# Patient Record
Sex: FEMALE | Race: WHITE | NOT HISPANIC OR LATINO | Employment: OTHER | URBAN - METROPOLITAN AREA
[De-identification: names, ages, dates, MRNs, and addresses within clinical notes are randomized per-mention and may not be internally consistent; named-entity substitution may affect disease eponyms.]

---

## 2017-01-10 ENCOUNTER — TELEPHONE (OUTPATIENT)
Dept: ONCOLOGY | Facility: CLINIC | Age: 72
End: 2017-01-10

## 2017-01-10 NOTE — TELEPHONE ENCOUNTER
I called Verenice to see why she didn't make appt with Dr. Melton, no answer.  Then called daughter, Linn, no answer, left vm that they need to reschedule to be seen by pulmonology.

## 2017-01-12 ENCOUNTER — OFFICE VISIT (OUTPATIENT)
Dept: PULMONOLOGY | Facility: CLINIC | Age: 72
End: 2017-01-12

## 2017-01-12 VITALS
BODY MASS INDEX: 21.05 KG/M2 | SYSTOLIC BLOOD PRESSURE: 132 MMHG | OXYGEN SATURATION: 97 % | DIASTOLIC BLOOD PRESSURE: 76 MMHG | WEIGHT: 118.8 LBS | HEART RATE: 100 BPM | TEMPERATURE: 97.7 F | RESPIRATION RATE: 18 BRPM | HEIGHT: 63 IN

## 2017-01-12 DIAGNOSIS — C34.81 MALIGNANT NEOPLASM OF OVERLAPPING SITES OF RIGHT LUNG (HCC): Primary | ICD-10-CM

## 2017-01-12 DIAGNOSIS — R91.8 ABNORMAL CT SCAN OF LUNG: ICD-10-CM

## 2017-01-12 DIAGNOSIS — R06.02 SHORTNESS OF BREATH: ICD-10-CM

## 2017-01-12 DIAGNOSIS — R91.8 ABNORMAL CT LUNG SCREENING: Primary | ICD-10-CM

## 2017-01-12 PROBLEM — J43.2 CENTRILOBULAR EMPHYSEMA (HCC): Status: ACTIVE | Noted: 2017-01-12

## 2017-01-12 PROCEDURE — 94375 RESPIRATORY FLOW VOLUME LOOP: CPT | Performed by: INTERNAL MEDICINE

## 2017-01-12 PROCEDURE — 99205 OFFICE O/P NEW HI 60 MIN: CPT | Performed by: INTERNAL MEDICINE

## 2017-01-12 PROCEDURE — 94729 DIFFUSING CAPACITY: CPT | Performed by: INTERNAL MEDICINE

## 2017-01-12 PROCEDURE — 94726 PLETHYSMOGRAPHY LUNG VOLUMES: CPT | Performed by: INTERNAL MEDICINE

## 2017-01-12 RX ORDER — MIRABEGRON 25 MG/1
TABLET, FILM COATED, EXTENDED RELEASE ORAL
Refills: 0 | COMMUNITY
Start: 2016-12-14

## 2017-01-12 RX ORDER — METHENAMINE HIPPURATE 1000 MG/1
TABLET ORAL
Refills: 0 | COMMUNITY
Start: 2016-12-13

## 2017-01-12 NOTE — MR AVS SNAPSHOT
Verenice Cainn   1/12/2017 11:00 AM   Office Visit    Dept Phone:  [phone]   Encounter #:  [id number]    Provider:  Robbin Dacosta MD   Department:  [facility]                Your Full Care Plan              Your Updated Medication List          This list is accurate as of: 1/12/17 11:12 AM.  Always use your most recent med list.                ADVAIR DISKUS 250-50 MCG/DOSE DISKUS   Generic drug:  fluticasone-salmeterol       hydrochlorothiazide 25 MG tablet   Commonly known as:  HYDRODIURIL       methenamine 1 G tablet   Commonly known as:  HIPREX       MYRBETRIQ 25 MG tablet sustained-release 24 hour   Generic drug:  Mirabegron ER       NEXIUM 40 MG capsule   Generic drug:  esomeprazole       oxyCODONE 15 MG immediate release tablet   Commonly known as:  ROXICODONE       prednisoLONE acetate 1 % ophthalmic suspension   Commonly known as:  PRED FORTE       verapamil  MG CR tablet   Commonly known as:  CALAN-SR       vitamin D 25360 UNITS capsule capsule   Commonly known as:  ERGOCALCIFEROL       XANAX 2 MG tablet   Generic drug:  ALPRAZolam       ZETIA 10 MG tablet   Generic drug:  ezetimibe               We Performed the Following     Pulmonary Function Test       You Were Diagnosed With        Codes Comments    Abnormal CT lung screening    -  Primary ICD-10-CM: R91.8  ICD-9-CM: 793.2       Instructions     None    Patient Instructions History      Upcoming Appointments     Visit Type Date Time Department    NEW PATIENT 1/12/2017 11:00 AM [facility]    CHEST X-RAY 1/12/2017 10:00 AM [facility]    FULL PFT 1/12/2017 10:30 AM [facility]    FOLLOW UP 4/19/2017 12:30 PM [facility]    FOLLOW UP 1 UNIT 11/7/2017  2:45 PM [facility]      MyChart Signup     Our records indicate that you have declined [facility] MyChart signup. If you would like to sign up for MyChart, please  "email [email or web address] or call [phone] to obtain an activation code.             Other Info from Your Visit           Your Appointments     Apr 19, 2017 12:30 PM EDT   Follow Up with Robbin Dacosta MD   [facility] (--)    [address] [place] [address] [address]  [facility] [id number] [phone]           Arrive 15 minutes prior to appointment.            Nov 07, 2017  2:45 PM EST   FOLLOW UP with João Watts MD   [facility] ([place])    [address], [address]  [facility] [address] [phone]              Allergies     No Known Allergies      Reason for Visit     Breathing Problem           Vital Signs     Blood Pressure Pulse Temperature Respirations Height Weight    132/76 100 97.7 °F (36.5 °C) 18 63\" (160 cm) 118 lb 12.8 oz (53.9 kg)    Oxygen Saturation Body Mass Index Smoking Status             97% 21.04 kg/m2 Former Smoker         Problems and Diagnoses Noted     Abnormal chest CT    -  Primary      Results     Pulmonary Function Test               "

## 2017-01-12 NOTE — PROGRESS NOTES
CHIEF COMPLAINT:  Referral for abnormal CT scan.    HISTORY OF PRESENT ILLNESS:  A 71-year-old white female with extensive smoking history, 4 packs per day for 30 years, quit in , COPD, hypertension, hyperlipidemia, recent admission to  [facility] in 2016 for septic shock from urinary tract infection requiring intubation and mechanical ventilation for 24 hours.  Data deficient, I do not have those records readily available.  She also has an extensive history of lung cancer.  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  She had either a new primary or recurrence in the left upper lobe in  which underwent CyberKnife treatment.  Subsequent PETs and imaging have been stable with a left apical pulmonary parenchymal scar that was borderline hypermetabolic.  The patient just had a CT scan performed for followup 2016 that showed a left upper lobe scar and showed in the left lower lobe 4 new nodules, the largest of which is 9 mm concerning for metastatic disease versus inflammatory process.  This is new from CT scan performed at [facility] on 2016.  The patient is asymptomatic from the standpoint of these nodules.  Her exercise is decent considering that she has had a pneumonectomy.  The patient is able to go up a flight of stairs and walk on a level surface without too much difficulty. She takes Symbicort and p.r.n. rescue inhaler.  The patient has no other complaints.    PAST MEDICAL HISTORY:  1.  Nonsmall cell lung cancer as detailed above.  2.  Hypertension.  3.  Hyperlipidemia.  4.  Vitamin D deficiency.  5.  COPD.    PAST SURGICAL HISTORY:  1.  Pneumonectomy in , right lung.  2.  Bladder surgery.  3.  Back surgery.    ALLERGIES/MEDICATIONS:  Reviewed.    FAMILY HISTORY:  Significant for a brother who  of lung cancer and multiple children with cancer including colon, breast, and cervical cancer.    SOCIAL  "HISTORY:  The patient has grade school education.  She is on disability.  She smoked 4 packs per day for 30 years, quit in 2004.  No other pulmonary exposures.    REVIEW OF SYSTEMS:  Her HPI, otherwise all other systems were reviewed and were negative.    DIAGNOSTIC DATA:  PFT reviewed and interpreted by me shows a combined obstructive and restrictive defect of moderate severity, moderate reduction in DLCO that partially corrects for alveolar volume, overall consistent with the patient's known pneumonectomy and COPD.  CT reviewed and interpreted by me shows right pneumonectomy, stable left upper lobe scar, and 4 new nodules in the left lower lobe, the largest of which is 9 mm concerning for metastatic disease versus inflammatory versus granulomatous disease.        Physical Exam   Constitutional: Oriented to person, place, and time. Cachectic appearing.   Head: Normocephalic and atraumatic.   Nose: Nose normal.   Mouth/Throat: Oropharynx is clear and moist.   Eyes: Conjunctivae are normal.   Neck: No tracheal deviation present.   Cardiovascular: Normal rate, regular rhythm, normal heart sounds and intact distal pulses.  Exam reveals no gallop and no friction rub.    No murmur heard.  Pulmonary/Chest: decreased breath sounds right lung, normal effort  Abdominal: Soft. Bowel sounds are normal. No distension. No tenderness. There is no guarding.   Musculoskeletal: Normal range of motion. No edema.   Lymphadenopathy:  No cervical adenopathy.   Neurological: Alert and oriented to person, place, and time.  No Focal Neurological Deficits Observed   Skin: Skin is warm and dry. No rash noted.   Psychiatric: Normal mood and affect.  Behavior is normal. Judgment normal.     Impression & Plan    1)  Abnormal CT - concerning for recurrent malignancy.  Navigational bronchoscopy not possible to my knowledge in the setting of pneumonectomy the system will not \"register\" properly.  Radial EBUS without navigation with lesions this " small would be very low yield.  CT guided biopsy would have a very high risk of pneumothorax in a patient with compromised lung function and pneumonectomy this risk would be prohibitive.  The largest and most concerning lesion is 9 mm plus which would be above the 8 mm threshold for PET.  Safest thing to do would be to do a PET and if positive treat with empiric SBRT.  If PET negative will repeat CT in 3 months.    2) COPD - continue symbicort and rescue inhaler, add incruse if her insurance will pay for it    Will call patient with results of PET    RTC 3 months    Robbin Dacosta MD  Pulmonology and Critical Care Medicine  01/12/17 11:23 AM  Electronically Signed

## 2017-01-18 ENCOUNTER — HOSPITAL ENCOUNTER (OUTPATIENT)
Dept: PET IMAGING | Facility: HOSPITAL | Age: 72
Discharge: HOME | End: 2017-01-18
Attending: INTERNAL MEDICINE

## 2017-01-18 ENCOUNTER — TELEPHONE (OUTPATIENT)
Dept: CARDIOLOGY | Facility: CLINIC | Age: 72
End: 2017-01-18

## 2017-01-18 ENCOUNTER — HOSPITAL ENCOUNTER (OUTPATIENT)
Dept: PET IMAGING | Facility: HOSPITAL | Age: 72
Discharge: HOME | End: 2017-01-18
Attending: INTERNAL MEDICINE | Admitting: INTERNAL MEDICINE

## 2017-01-18 DIAGNOSIS — C34.81 MALIGNANT NEOPLASM OF OVERLAPPING SITES OF RIGHT LUNG (HCC): ICD-10-CM

## 2017-01-18 DIAGNOSIS — R06.02 SHORTNESS OF BREATH: ICD-10-CM

## 2017-01-18 LAB — GLUCOSE BLDC GLUCOMTR-MCNC: 84 MG/DL (ref 70–130)

## 2017-01-18 PROCEDURE — 0 FLUDEOXYGLUCOSE F18 SOLUTION: Performed by: INTERNAL MEDICINE

## 2017-01-18 PROCEDURE — 82962 GLUCOSE BLOOD TEST: CPT

## 2017-01-18 PROCEDURE — 78815 PET IMAGE W/CT SKULL-THIGH: CPT

## 2017-01-18 PROCEDURE — A9552 F18 FDG: HCPCS | Performed by: INTERNAL MEDICINE

## 2017-01-18 RX ADMIN — FLUDEOXYGLUCOSE F18 1 DOSE: 300 INJECTION INTRAVENOUS at 10:32

## 2017-01-18 NOTE — TELEPHONE ENCOUNTER
Called with results of PET, left message on machine.  Will discuss + PET w/ Dr. Watts and Dr. Mendoza, probably needs SBRT.    Robbin Dacosta MD  Pulmonology and Critical Care Medicine  01/18/17 5:18 PM  Electronically Signed

## 2017-01-20 DIAGNOSIS — C34.81 MALIGNANT NEOPLASM OF OVERLAPPING SITES OF RIGHT LUNG (HCC): Primary | ICD-10-CM

## 2017-01-24 ENCOUNTER — TELEPHONE (OUTPATIENT)
Dept: PULMONOLOGY | Facility: CLINIC | Age: 72
End: 2017-01-24

## 2017-01-24 NOTE — TELEPHONE ENCOUNTER
Spoke with patient directly regarding her positive PET results.  I also discussed the case with Dr. Watts.  Given her already compromised lung function and prior pneumonectomy, we both feel SBRT would probably be her best option and least risk.  She has an appointment to see Dr. Mendoza Feb 6th.  Patient verbalized understanding.    Robbin Dacosta MD  Pulmonology and Critical Care Medicine  01/24/17 11:46 AM  Electronically Signed

## 2017-02-02 ENCOUNTER — TELEPHONE (OUTPATIENT)
Dept: ONCOLOGY | Facility: CLINIC | Age: 72
End: 2017-02-02

## 2017-02-02 NOTE — TELEPHONE ENCOUNTER
----- Message from Christiana Mendoza MA sent at 2/2/2017 11:13 AM EST -----  Regarding: Mac- questions about cyberknife  Contact: [phone]  Pt daughter has questions about cyber knife treatments that she wanted to run by Dr Watts/staff.

## 2017-02-06 ENCOUNTER — HOSPITAL ENCOUNTER (OUTPATIENT)
Dept: RADIATION ONCOLOGY | Facility: HOSPITAL | Age: 72
Setting detail: RADIATION/ONCOLOGY SERIES
Discharge: HOME | End: 2017-02-06

## 2017-02-09 ENCOUNTER — APPOINTMENT (OUTPATIENT)
Dept: RADIATION ONCOLOGY | Facility: HOSPITAL | Age: 72
End: 2017-02-09

## 2017-02-09 ENCOUNTER — OFFICE VISIT (OUTPATIENT)
Dept: RADIATION ONCOLOGY | Facility: HOSPITAL | Age: 72
End: 2017-02-09

## 2017-02-09 VITALS
BODY MASS INDEX: 21.55 KG/M2 | HEART RATE: 85 BPM | RESPIRATION RATE: 16 BRPM | DIASTOLIC BLOOD PRESSURE: 67 MMHG | TEMPERATURE: 98.3 F | SYSTOLIC BLOOD PRESSURE: 147 MMHG | HEIGHT: 63 IN | WEIGHT: 121.6 LBS

## 2017-02-09 DIAGNOSIS — C34.92 MALIGNANT NEOPLASM OF LEFT LUNG, UNSPECIFIED PART OF LUNG (HCC): Primary | ICD-10-CM

## 2017-02-09 PROCEDURE — 77470 SPECIAL RADIATION TREATMENT: CPT | Performed by: RADIOLOGY

## 2017-02-09 PROCEDURE — 77334 RADIATION TREATMENT AID(S): CPT | Performed by: RADIOLOGY

## 2017-02-09 PROCEDURE — 77290 THER RAD SIMULAJ FIELD CPLX: CPT | Performed by: RADIOLOGY

## 2017-02-09 NOTE — PROGRESS NOTES
CONSULTATION NOTE    NAME:      Verenice Bravo  :                                                          1945  DATE OF CONSULTATION:                       17   REQUESTING PHYSICIAN:                   Robbin Bledsoe*  REASON FOR CONSULTATION:             Radiographic diagnosis of lung cancer, [id number], Stage I       BRIEF HISTORY:  Verenice Bravo  is a very pleasant 71 y.o. female  with a history of Stage III squamous cell carcinoma of the lung status post right pneumonectomy 2005.  She had CyberKnife  SBRT to PET probable left upper lobe cancer in .  In  she developed had a new left upper lobe mass in the apical region measuring 3 cm with small cavitary component compared to  2011. Subsequent PET in 2012 showed what was most likely postradiation scar lighting up and not clearly PET malignancy at that junction. Most recent PET scan 2013 revealed decreasing activity in the left apical pulmonary parenchymal scar and was only borderline hypermetabolic at that time. Follow-up CTs have been stable.     The patient had a PET scan on17 that read:  New findings are noted from previous studies of 2013. There is a 1.8 x 1.5 cm soft tissue nodule in the posterior midportion of the left lung which is a new finding and which is substantially hypermetabolic with a maximum SUV value of 11.53 indicating contralateral tumor recurrence or metastatic deposit.  2. In addition, there is a small spiculated lung nodule in the anterior portion of the residual left lung measuring 8 x 10 mm with a maximum SUV value of 1.97. Although this is below the malignant threshold, this nodule is new and spiculated and relatively small. It is considered an early metastasis. Therefore, there are 2 lesions in the left lung, both new from previous studies.  3. Right pneumonectomy changes are identified in the right hemithorax which are stable. There is no hypermetabolic mediastinal or hilar mass  "lesion.  4. Adrenal glands, liver, abdomen and retroperitoneum are clear. The upper mediastinum and thoracic inlet are unremarkable. There is marked pleural and fibronodular apical scarring on the left which is not hypermetabolic.     She is here to discuss CK SBRT.  She has complaints of SOA and gait disburbance.    .   No Known Allergies    Social History   Substance Use Topics   • Smoking status: Former Smoker     Packs/day: 4.00     Years: 33.00     Types: Cigarettes     Quit date: 1/17/2001   • Smokeless tobacco: Never Used   • Alcohol use No       Past Medical History   Diagnosis Date   • Arm and leg pain      Mild left   • Arthritis    • COPD (chronic obstructive pulmonary disease)    • H/O chest x-ray 04/22/2012     Spiculated BRAN lesion new in compariosn to previous. A chest CT is recommended   • Hypertension    • Lung cancer        family history includes Cancer in her other; Diabetes in her mother.     Past Surgical History   Procedure Laterality Date   • Lung surgery       rt lung removed   • Bladder surgery  04/2016     Tack with mesh   • Other surgical history       Pneumonectomy Complete   • Thoracotomy     • Tubal abdominal ligation     • Back surgery     • Eye surgery       Cataract        Review of Systems   Constitutional: Positive for fatigue.   Respiratory: Positive for shortness of breath (with extreme activity).    Gastrointestinal: Positive for constipation.   Musculoskeletal: Positive for back pain and gait problem.   Neurological: Positive for gait problem. Headaches: Pt states her feet get tired.           Objective   VITAL SIGNS:   Vitals:    02/09/17 0824   BP: 147/67   Pulse: 85   Resp: 16   Temp: 98.3 °F (36.8 °C)   Weight: 121 lb 9.6 oz (55.2 kg)   Height: 63\" (160 cm)   PainSc: 0-No pain        KPS        90%    Physical Exam   Constitutional: She is oriented to person, place, and time. She appears well-developed and well-nourished. No distress.   HENT:   Head: Normocephalic and " atraumatic.   Eyes: EOM are normal. No scleral icterus.   Neck: Neck supple.   Cardiovascular: Normal rate, regular rhythm and normal heart sounds.  Exam reveals no gallop and no friction rub.    No murmur heard.  Pulmonary/Chest: Effort normal.   No  Breath sounds on right - pneumonectomy   Musculoskeletal: She exhibits no edema.   Lymphadenopathy:     She has no cervical adenopathy.   Neurological: She is alert and oriented to person, place, and time.   Skin: Skin is warm and dry.   Nursing note and vitals reviewed.       The following portions of the patient's history were reviewed and updated as appropriate: allergies, current medications, past family history, past medical history, past social history, past surgical history and problem list.    Assessment        IMPRESSION:  Radiographic diagnosis of lung cancer, [id number], Stage I     RECOMMENDATIONS:  I recommend CyberKnife stereotactic body radiotherapy to the left lung mass.  It is 1.8 cm and can be tracked.  We can't get a biopsy because she's had a pneumonectomy and we can't risk a pneumothorax.  For the same reason we can't have fiducials placed.  Anticipate 50 gray in 4 fractions to the mass that is close to the chest wall.  We will repeat scans in 3 months to follow the anterior nodule.  It's too small to track at this time.  The patient and her family are no agreement we will simulate fields today and anticipate starting next week. We will get an MRI of her brain to complete the staging.                Sierra Montgoemry MD      Errors in dictation may reflect use of voice recognition software and not all errors in transcription may have been detected prior to signing.

## 2017-02-14 PROCEDURE — 77300 RADIATION THERAPY DOSE PLAN: CPT | Performed by: RADIOLOGY

## 2017-02-14 PROCEDURE — 77295 3-D RADIOTHERAPY PLAN: CPT | Performed by: RADIOLOGY

## 2017-02-14 PROCEDURE — 77370 RADIATION PHYSICS CONSULT: CPT | Performed by: RADIOLOGY

## 2017-02-14 PROCEDURE — 77334 RADIATION TREATMENT AID(S): CPT | Performed by: RADIOLOGY

## 2017-02-17 DIAGNOSIS — C34.81 MALIGNANT NEOPLASM OF OVERLAPPING SITES OF RIGHT LUNG (HCC): Primary | ICD-10-CM

## 2017-02-21 ENCOUNTER — HOSPITAL ENCOUNTER (OUTPATIENT)
Dept: RADIATION ONCOLOGY | Facility: HOSPITAL | Age: 72
Discharge: HOME | End: 2017-02-21

## 2017-02-21 PROCEDURE — 77373 STRTCTC BDY RAD THER TX DLVR: CPT | Performed by: RADIOLOGY

## 2017-02-21 PROCEDURE — 77290 THER RAD SIMULAJ FIELD CPLX: CPT | Performed by: RADIOLOGY

## 2017-02-22 ENCOUNTER — HOSPITAL ENCOUNTER (OUTPATIENT)
Dept: RADIATION ONCOLOGY | Facility: HOSPITAL | Age: 72
Discharge: HOME | End: 2017-02-22

## 2017-02-22 PROCEDURE — 77373 STRTCTC BDY RAD THER TX DLVR: CPT | Performed by: RADIOLOGY

## 2017-02-22 PROCEDURE — 77280 THER RAD SIMULAJ FIELD SMPL: CPT | Performed by: RADIOLOGY

## 2017-02-23 ENCOUNTER — HOSPITAL ENCOUNTER (OUTPATIENT)
Dept: RADIATION ONCOLOGY | Facility: HOSPITAL | Age: 72
Discharge: HOME | End: 2017-02-23

## 2017-02-23 PROCEDURE — 77280 THER RAD SIMULAJ FIELD SMPL: CPT | Performed by: RADIOLOGY

## 2017-02-23 PROCEDURE — 77373 STRTCTC BDY RAD THER TX DLVR: CPT | Performed by: RADIOLOGY

## 2017-02-24 ENCOUNTER — HOSPITAL ENCOUNTER (OUTPATIENT)
Dept: RADIATION ONCOLOGY | Facility: HOSPITAL | Age: 72
Discharge: HOME | End: 2017-02-24

## 2017-02-24 PROCEDURE — 77373 STRTCTC BDY RAD THER TX DLVR: CPT | Performed by: RADIOLOGY

## 2017-02-24 PROCEDURE — 77280 THER RAD SIMULAJ FIELD SMPL: CPT | Performed by: RADIOLOGY

## 2017-02-24 PROCEDURE — 77336 RADIATION PHYSICS CONSULT: CPT | Performed by: RADIOLOGY

## 2017-03-16 ENCOUNTER — OFFICE VISIT (OUTPATIENT)
Dept: RADIATION ONCOLOGY | Facility: HOSPITAL | Age: 72
End: 2017-03-16

## 2017-03-16 ENCOUNTER — HOSPITAL ENCOUNTER (OUTPATIENT)
Dept: RADIATION ONCOLOGY | Facility: HOSPITAL | Age: 72
Setting detail: RADIATION/ONCOLOGY SERIES
Discharge: HOME | End: 2017-03-16

## 2017-03-16 VITALS
BODY MASS INDEX: 22.06 KG/M2 | SYSTOLIC BLOOD PRESSURE: 112 MMHG | RESPIRATION RATE: 18 BRPM | TEMPERATURE: 98.6 F | HEART RATE: 94 BPM | DIASTOLIC BLOOD PRESSURE: 56 MMHG | HEIGHT: 63 IN | WEIGHT: 124.5 LBS

## 2017-03-16 DIAGNOSIS — C34.81 MALIGNANT NEOPLASM OF OVERLAPPING SITES OF RIGHT LUNG (HCC): Primary | ICD-10-CM

## 2017-03-16 RX ORDER — GABAPENTIN 800 MG/1
TABLET ORAL
Refills: 0 | COMMUNITY
Start: 2017-03-02

## 2017-03-16 RX ORDER — SUMATRIPTAN 25 MG/1
TABLET, FILM COATED ORAL
Refills: 0 | COMMUNITY
Start: 2017-02-10

## 2017-03-16 RX ORDER — OXYCODONE HYDROCHLORIDE 30 MG/1
TABLET ORAL
Refills: 0 | COMMUNITY
Start: 2017-03-02 | End: 2017-12-06 | Stop reason: SDUPTHER

## 2017-03-16 RX ORDER — MIRABEGRON 50 MG/1
TABLET, FILM COATED, EXTENDED RELEASE ORAL
Refills: 0 | COMMUNITY
Start: 2017-02-09

## 2017-03-16 RX ORDER — AMOXICILLIN AND CLAVULANATE POTASSIUM 875; 125 MG/1; MG/1
TABLET, FILM COATED ORAL
Refills: 0 | COMMUNITY
Start: 2017-03-02

## 2017-03-16 NOTE — PROGRESS NOTES
"FOLLOW UP NOTE    PATIENT:                                                      Verenice Bravo  MEDICAL RECORD #:                        [id number]  :                                                          1945  COMPLETION DATE:   2017  DIAGNOSIS:    Radiographic diagnosis of lung cancer, [id number], Stage I     BRIEF HISTORY:    Shelly completed CyberKnife stereotactic body radiotherapy for early stage lung cancer.  Since completing treatment she was hospitalized with flu and was septic.  She has recovered and has plans to see Dr. Dacosta on .  She is on oxygen .      MEDICATIONS: Medication reconciliation for the patient was reviewed and confirmed in the electronic medical record.    Review of Systems   Respiratory: Positive for cough, shortness of breath (pt on oxygen) and wheezing.    Musculoskeletal: Positive for gait problem (due to low O2 sats.).   Neurological: Positive for extremity weakness and gait problem (due to low O2 sats.).   All other systems reviewed and are negative.      KPS 80%    Physical Exam   Constitutional: She appears well-developed and well-nourished.   Neck: Neck supple.   Cardiovascular: Normal rate, regular rhythm and normal heart sounds.    Pulmonary/Chest: Effort normal and breath sounds normal.   Nursing note and vitals reviewed.      VITAL SIGNS:   Vitals:    17 1457   BP: 112/56   Pulse: 94   Resp: 18   Temp: 98.6 °F (37 °C)   Weight: 124 lb 8 oz (56.5 kg)   Height: 63\" (160 cm)   PainSc: 0-No pain   PainLoc: Back       The following portions of the patient's history were reviewed and updated as appropriate: allergies, current medications, past family history, past medical history, past social history, past surgical history and problem list.       IMPRESSION:  No acute side effects of radiotherapy.  Her flu symptoms have resolved    RECOMMENDATIONS:  The patient continues on antibiotics Tamiflu and oxygen.  We will see her back in 6 months unless " she is being followed by Dr. Watts and Dr. Dacosta closely and then she can see us as needed.  We would recommend a 6 month PET scan to evaluate treatment.  Mrs. Bravo is excited about an upcoming trip to [place] and her first flight!         Sierra Montgomery MD    Errors in dictation may reflect use of voice recognition software and not all errors in transcription may have been detected prior to signing.

## 2017-04-10 ENCOUNTER — HOSPITAL ENCOUNTER (OUTPATIENT)
Dept: MRI IMAGING | Facility: HOSPITAL | Age: 72
End: 2017-04-10
Attending: RADIOLOGY

## 2017-04-11 ENCOUNTER — HOSPITAL ENCOUNTER (OUTPATIENT)
Dept: MRI IMAGING | Facility: HOSPITAL | Age: 72
Discharge: HOME | End: 2017-04-11
Attending: RADIOLOGY | Admitting: RADIOLOGY

## 2017-04-11 DIAGNOSIS — C34.81 MALIGNANT NEOPLASM OF OVERLAPPING SITES OF RIGHT LUNG (HCC): ICD-10-CM

## 2017-04-11 PROCEDURE — 0 GADOBENATE DIMEGLUMINE 529 MG/ML SOLUTION: Performed by: RADIOLOGY

## 2017-04-11 PROCEDURE — 70553 MRI BRAIN STEM W/O & W/DYE: CPT

## 2017-04-11 PROCEDURE — 82565 ASSAY OF CREATININE: CPT

## 2017-04-11 PROCEDURE — A9577 INJ MULTIHANCE: HCPCS | Performed by: RADIOLOGY

## 2017-04-11 RX ADMIN — GADOBENATE DIMEGLUMINE 10 ML: 529 INJECTION, SOLUTION INTRAVENOUS at 20:15

## 2017-04-12 LAB — CREAT BLDA-MCNC: 0.7 MG/DL (ref 0.6–1.3)

## 2017-04-17 ENCOUNTER — HOSPITAL ENCOUNTER (OUTPATIENT)
Dept: CT IMAGING | Facility: HOSPITAL | Age: 72
Discharge: HOME | End: 2017-04-17
Attending: INTERNAL MEDICINE | Admitting: INTERNAL MEDICINE

## 2017-04-17 DIAGNOSIS — R06.02 SHORTNESS OF BREATH: ICD-10-CM

## 2017-04-17 DIAGNOSIS — C34.81 MALIGNANT NEOPLASM OF OVERLAPPING SITES OF RIGHT LUNG (HCC): ICD-10-CM

## 2017-04-17 PROCEDURE — 71250 CT THORAX DX C-: CPT

## 2017-04-19 ENCOUNTER — OFFICE VISIT (OUTPATIENT)
Dept: PULMONOLOGY | Facility: CLINIC | Age: 72
End: 2017-04-19

## 2017-04-19 VITALS
HEART RATE: 102 BPM | RESPIRATION RATE: 16 BRPM | WEIGHT: 118.6 LBS | DIASTOLIC BLOOD PRESSURE: 70 MMHG | BODY MASS INDEX: 21.02 KG/M2 | OXYGEN SATURATION: 94 % | HEIGHT: 63 IN | SYSTOLIC BLOOD PRESSURE: 124 MMHG | TEMPERATURE: 97.2 F

## 2017-04-19 DIAGNOSIS — C34.32 MALIGNANT NEOPLASM OF LOWER LOBE OF LEFT LUNG (HCC): ICD-10-CM

## 2017-04-19 DIAGNOSIS — J43.2 CENTRILOBULAR EMPHYSEMA (HCC): Primary | ICD-10-CM

## 2017-04-19 DIAGNOSIS — Z90.2 S/P PNEUMONECTOMY: ICD-10-CM

## 2017-04-19 PROCEDURE — 99214 OFFICE O/P EST MOD 30 MIN: CPT | Performed by: INTERNAL MEDICINE

## 2017-04-19 RX ORDER — LANOLIN ALCOHOL/MO/W.PET/CERES
1000 CREAM (GRAM) TOPICAL DAILY
COMMUNITY

## 2017-04-19 NOTE — PROGRESS NOTES
CHIEF COMPLAINT: Referral for abnormal CT scan.     HISTORY OF PRESENT ILLNESS: A 71-year-old white female with extensive smoking history, 4 packs per day for 30 years, quit in 2004, COPD, hypertension, and hyperlipidemia.  She also has an extensive history of lung cancer. 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 She had either a new primary or recurrence in the left upper lobe in 2010 which underwent CyberKnife treatment. Subsequent PETs and imaging have been stable with a left apical pulmonary parenchymal scar that was borderline hypermetabolic. The patient had a CT scan performed for followup 12/19/2016 that showed a left upper lobe scar and showed in the left lower lobe 2 new dominant nodules. 1.8 cm in the left base w/ SUV 11.5 and 10 mm in apical portion w/ SUV 1.97.  The 1.8 cm nodule was treated with SBRT, the more apical 10 mm LLL nodule wasn't treated because it was to small to be tracked and fiducials weren't possible (due to prior right pneumonectomy and risk of pneumothorax).  Patient just had repeat CT scan and comes in today for follow up.  She was just hospitalized at [facility] w/ pneumonia and influenza - has been home about 3 weeks and is improving.  She still has a lot of S[place].       CT reviewed and interpreted by me shows right pneumonectomy, stable left upper lobe scar, the LLL nodule has been replaced by some radiation fibrosis, the more apical LLL nodule is stable     Physical Exam   Constitutional: Oriented to person, place, and time. Appears well-developed and well-nourished.   Head: Normocephalic and atraumatic.   Nose: Nose normal.   Mouth/Throat: Oropharynx is clear and moist.   Eyes: Conjunctivae are normal.   Neck: No tracheal deviation present.   Cardiovascular: Normal rate, regular rhythm, normal heart sounds and intact distal pulses.  Exam reveals no gallop and no friction rub.    No murmur heard.  Pulmonary/Chest:  Effort normal and breath sounds absent on right. No stridor. No respiratory distress. No wheezes. No rales. No tenderness.   Abdominal: Soft. Bowel sounds are normal. No distension. No tenderness. There is no guarding.   Musculoskeletal: Normal range of motion. No edema.   Lymphadenopathy:  No cervical adenopathy.   Neurological: Alert and oriented to person, place, and time.  No Focal Neurological Deficits Observed   Skin: Skin is warm and dry. No rash noted.   Psychiatric: Normal mood and affect.  Behavior is normal. Judgment normal.     Impression & Plan    1)  NSCLC - complicated history detailed above.  It appears that the 1.8 cm nodule is in remission.  Will continue to follow the apical/anterior 10 mm LLL nodule.  Dr. Montgomery is getting a PET in the next few months.    2)  COPD - will change everything to nebulized, given her limited lung function I think she will do better with this.  Brovana / Pulmicort / Ipratropium bid.  Albuterol prn.  She is not smoking.  Not currently interested in pulmonary rehab.  Continue O2 w/ exertion and sleep.    RTC 3 months (After PET Scan)    Robbin Dacosta MD  Pulmonology and Critical Care Medicine  04/19/17 1:07 PM  Electronically Signed

## 2017-11-13 ENCOUNTER — OFFICE VISIT (OUTPATIENT)
Dept: ONCOLOGY | Facility: CLINIC | Age: 72
End: 2017-11-13

## 2017-11-13 VITALS
TEMPERATURE: 97.4 F | SYSTOLIC BLOOD PRESSURE: 111 MMHG | WEIGHT: 108 LBS | HEART RATE: 80 BPM | HEIGHT: 63 IN | BODY MASS INDEX: 19.14 KG/M2 | RESPIRATION RATE: 18 BRPM | DIASTOLIC BLOOD PRESSURE: 53 MMHG

## 2017-11-13 DIAGNOSIS — C34.81 MALIGNANT NEOPLASM OF OVERLAPPING SITES OF RIGHT LUNG (HCC): Primary | ICD-10-CM

## 2017-11-13 DIAGNOSIS — M54.9 UPPER BACK PAIN ON LEFT SIDE: ICD-10-CM

## 2017-11-13 DIAGNOSIS — C34.82 MALIGNANT NEOPLASM OF OVERLAPPING SITES OF LEFT LUNG (HCC): ICD-10-CM

## 2017-11-13 PROCEDURE — 99213 OFFICE O/P EST LOW 20 MIN: CPT | Performed by: NURSE PRACTITIONER

## 2017-11-13 RX ORDER — ESCITALOPRAM OXALATE 10 MG/1
TABLET ORAL
Refills: 0 | COMMUNITY
Start: 2017-10-09

## 2017-11-13 RX ORDER — GABAPENTIN 600 MG/1
TABLET ORAL
Refills: 0 | COMMUNITY
Start: 2017-11-08

## 2017-11-13 NOTE — PROGRESS NOTES
CHIEF COMPLAINT: 1.  Follow-up management of lung cancer                                       2.  Left subscapular back pain    Problem List:  Oncology/Hematology History    1. 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 07/2012 she had a new left upper lobe mass  in the apical region measuring 3 cm with small cavitary component compared to  06/2011. Subsequent PET in 08/2012 showed what was most likely postradiation  scar lighting up and not clearly PET malignancy at that junction. Most recent  PET scan 01/07/2013 revealed decreasing activity in the left apical pulmonary  parenchymal scar and was only borderline hypermetabolic at that time. Follow-up  CTs have been stable.   2. History of squamous cell carcinoma of the skin.         Malignant neoplasm of overlapping sites of right lung    1/10/2005 Initial Diagnosis     Malignant neoplasm of overlapping sites of right lung         11/10/2005 Surgery     Right pneumonectomy         7/22/2010 -  Radiation     Cyberknife for probable left upper lobe recurrence         1/18/2017 Progression            1/18/2017 Imaging     PET/CT IMPRESSION:  1. The patient is status post right pneumonectomy.  2. In the residual hyperexpanded left lung, there are 2 new  hypermetabolic nodules indicating new neoplastic disease in the  contralateral left lung.  Mediastinal or hilar involvement is currently  not identified. No other distant hypermetabolic focus or abnormality is  appreciated.         02/21/2017 - 02/24/2017 Radiation     Radiation OncologyTreatment Course:  Verenice Bravo received 5000 cGy in 4 fractions to left lung tumor via Stereotactic Radiation Therapy - SRT.            HISTORY OF PRESENT ILLNESS:  The patient is a 72 y.o. female, here for follow up on management of Lung cancer.  In the interim since we saw the patient  last, she underwent CyberKnife to left lung lesion in February 2017.  Since that time, she has been hospitalized for pneumonia on 2 occasions.  One hospitalization she was on life support.  It is been a stressful time over the last few months as she has lost 2 sons within a very short time of each other due to heart disease.  She is having increasing left subscapular pain.  The pain medication she takes currently is not as effective as it once was.  She is also on oxygen 24/7.      Past Medical History:   Diagnosis Date   • Arm and leg pain     Mild left   • Arthritis    • COPD (chronic obstructive pulmonary disease)    • H/O chest x-ray 04/22/2012    Spiculated BRAN lesion new in compariosn to previous. A chest CT is recommended   • Hypertension    • Lung cancer      Past Surgical History:   Procedure Laterality Date   • BACK SURGERY     • BLADDER SURGERY  04/2016    Tack with mesh   • EYE SURGERY      Cataract   • LUNG SURGERY      rt lung removed   • OTHER SURGICAL HISTORY      Pneumonectomy Complete   • THORACOTOMY     • TUBAL ABDOMINAL LIGATION         No Known Allergies    Family History and Social History reviewed and changed as necessary      REVIEW OF SYSTEM:   Review of Systems   Constitutional: Positive for fatigue and 10 pound weight loss since April.    HENT:   Negative for mouth sores, sore throat and trouble swallowing.    Eyes: Negative for icterus.   Respiratory:Positive for shortness of breath with exertion, O2 dependent.    Cardiovascular: Negative for chest pain, leg swelling and palpitations.   Gastrointestinal: Negative for abdominal distention, abdominal pain, blood in stool, constipation, diarrhea, nausea and vomiting.   Endocrine: Negative for hot flashes.   Genitourinary: Negative for bladder incontinence, difficulty urinating, dysuria, frequency and hematuria.    Musculoskeletal: Positive for left subscapular pain chronic in nature but worsening.   Skin: Negative for rash.   Neurological:  "Negative for dizziness, gait problem, headaches, light-headedness and numbness.   Hematological: Negative for adenopathy. Does not bruise/bleed easily.   Psychiatric/Behavioral: Negative for depression. The patient is not nervous/anxious.    All other systems reviewed and are negative except as stated above in the history of present illness.       PHYSICAL EXAM    Vitals:    11/13/17 1220   BP: 111/53   Pulse: 80   Resp: 18   Temp: 97.4 °F (36.3 °C)   Weight: 108 lb (49 kg)   Height: 63\" (160 cm)     Constitutional: Petite, chronically ill-appearing elderly female. No distress.   ECOG: (3) Capable of limited self-care, confined to bed or chair > 50% of waking hours  HENT:   Head: Normocephalic.   Mouth/Throat: Oropharynx is clear and moist.   Eyes: Conjunctivae are normal. Pupils are equal, round, and reactive to light. No scleral icterus.   Neck: Neck supple. No JVD present. No thyromegaly present.   Cardiovascular: Normal rate, regular rhythm and normal heart sounds.    Pulmonary/Chest: Breath sounds  diminished throughout otherwise normal. No respiratory distress. Wearing O2.  Abdominal: Soft. Exhibits no distension.   Musculoskeletal:Exhibits no edema, tenderness or deformity.  Arrives via transport chair accompanied by family.   Neurological: Alert and oriented to person, place, and time.   Skin: No ecchymosis, no petechiae and no rash noted. Not diaphoretic. No cyanosis. Nails show no clubbing.   Psychiatric: Normal mood and affect.   Vitals reviewed.        Assessment/Plan     1.  History of recurrent left lung cancer status post CyberKnife:  The patient has not had her post-CyberKnife PET/CT for reevaluation as she had been in the hospital on 2 different occasions for pneumonia.  She is having increasing left subscapular back pain.  We will get her PET scans scheduled to restage her, we will see her back in a couple weeks to go over this.      10 minutes of today's 15 minute appointment was spent in " counseling and education in regards to the above.      Janelle Smith, APRN    11/13/2017

## 2017-11-20 ENCOUNTER — HOSPITAL ENCOUNTER (OUTPATIENT)
Dept: PET IMAGING | Facility: HOSPITAL | Age: 72
Discharge: HOME | End: 2017-11-20
Admitting: NURSE PRACTITIONER

## 2017-11-20 ENCOUNTER — HOSPITAL ENCOUNTER (OUTPATIENT)
Dept: PET IMAGING | Facility: HOSPITAL | Age: 72
Discharge: HOME | End: 2017-11-20

## 2017-11-20 DIAGNOSIS — C34.81 MALIGNANT NEOPLASM OF OVERLAPPING SITES OF RIGHT LUNG (HCC): ICD-10-CM

## 2017-11-20 LAB — GLUCOSE BLDC GLUCOMTR-MCNC: 109 MG/DL (ref 70–130)

## 2017-11-20 PROCEDURE — 82962 GLUCOSE BLOOD TEST: CPT

## 2017-11-20 PROCEDURE — 78815 PET IMAGE W/CT SKULL-THIGH: CPT

## 2017-11-20 PROCEDURE — 0 FLUDEOXYGLUCOSE F18 SOLUTION: Performed by: NURSE PRACTITIONER

## 2017-11-20 PROCEDURE — A9552 F18 FDG: HCPCS | Performed by: NURSE PRACTITIONER

## 2017-11-20 RX ADMIN — FLUDEOXYGLUCOSE F18 1 DOSE: 300 INJECTION INTRAVENOUS at 13:12

## 2017-11-28 ENCOUNTER — OFFICE VISIT (OUTPATIENT)
Dept: ONCOLOGY | Facility: CLINIC | Age: 72
End: 2017-11-28

## 2017-11-28 ENCOUNTER — LAB (OUTPATIENT)
Dept: LAB | Facility: HOSPITAL | Age: 72
End: 2017-11-28

## 2017-11-28 VITALS
TEMPERATURE: 98.2 F | BODY MASS INDEX: 18.61 KG/M2 | SYSTOLIC BLOOD PRESSURE: 147 MMHG | HEIGHT: 63 IN | RESPIRATION RATE: 16 BRPM | DIASTOLIC BLOOD PRESSURE: 68 MMHG | HEART RATE: 118 BPM | WEIGHT: 105 LBS

## 2017-11-28 DIAGNOSIS — Z85.118 PERSONAL HISTORY OF LUNG CANCER: ICD-10-CM

## 2017-11-28 DIAGNOSIS — C34.82 MALIGNANT NEOPLASM OF OVERLAPPING SITES OF LEFT LUNG (HCC): Primary | ICD-10-CM

## 2017-11-28 LAB
ALBUMIN SERPL-MCNC: 3.8 G/DL (ref 3.2–4.8)
ALBUMIN/GLOB SERPL: 1.5 G/DL (ref 1.5–2.5)
ALP SERPL-CCNC: 81 U/L (ref 25–100)
ALT SERPL W P-5'-P-CCNC: 13 U/L (ref 7–40)
ANION GAP SERPL CALCULATED.3IONS-SCNC: 6 MMOL/L (ref 3–11)
AST SERPL-CCNC: 14 U/L (ref 0–33)
BILIRUB SERPL-MCNC: 0.5 MG/DL (ref 0.3–1.2)
BUN BLD-MCNC: 19 MG/DL (ref 9–23)
BUN/CREAT SERPL: 27.1 (ref 7–25)
CALCIUM SPEC-SCNC: 9.2 MG/DL (ref 8.7–10.4)
CHLORIDE SERPL-SCNC: 99 MMOL/L (ref 99–109)
CO2 SERPL-SCNC: 29 MMOL/L (ref 20–31)
CREAT BLD-MCNC: 0.7 MG/DL (ref 0.6–1.3)
ERYTHROCYTE [DISTWIDTH] IN BLOOD BY AUTOMATED COUNT: 14.8 % (ref 11.3–14.5)
GFR SERPL CREATININE-BSD FRML MDRD: 82 ML/MIN/1.73
GLOBULIN UR ELPH-MCNC: 2.5 GM/DL
GLUCOSE BLD-MCNC: 140 MG/DL (ref 70–100)
HCT VFR BLD AUTO: 36.9 % (ref 34.5–44)
HGB BLD-MCNC: 12 G/DL (ref 11.5–15.5)
LYMPHOCYTES # BLD AUTO: 1 10*3/MM3 (ref 0.6–4.8)
LYMPHOCYTES NFR BLD AUTO: 8.4 % (ref 24–44)
MCH RBC QN AUTO: 28.9 PG (ref 27–31)
MCHC RBC AUTO-ENTMCNC: 32.4 G/DL (ref 32–36)
MCV RBC AUTO: 88.9 FL (ref 80–99)
MONOCYTES # BLD AUTO: 0.3 10*3/MM3 (ref 0–1)
MONOCYTES NFR BLD AUTO: 2.5 % (ref 0–12)
NEUTROPHILS # BLD AUTO: 10.6 10*3/MM3 (ref 1.5–8.3)
NEUTROPHILS NFR BLD AUTO: 89.1 % (ref 41–71)
PLATELET # BLD AUTO: 386 10*3/MM3 (ref 150–450)
PMV BLD AUTO: 7.5 FL (ref 6–12)
POTASSIUM BLD-SCNC: 3.1 MMOL/L (ref 3.5–5.5)
PROT SERPL-MCNC: 6.3 G/DL (ref 5.7–8.2)
RBC # BLD AUTO: 4.15 10*6/MM3 (ref 3.89–5.14)
SODIUM BLD-SCNC: 134 MMOL/L (ref 132–146)
WBC NRBC COR # BLD: 11.9 10*3/MM3 (ref 3.5–10.8)

## 2017-11-28 PROCEDURE — 99215 OFFICE O/P EST HI 40 MIN: CPT | Performed by: INTERNAL MEDICINE

## 2017-11-28 PROCEDURE — 80053 COMPREHEN METABOLIC PANEL: CPT

## 2017-11-28 PROCEDURE — 36415 COLL VENOUS BLD VENIPUNCTURE: CPT

## 2017-11-28 PROCEDURE — 85025 COMPLETE CBC W/AUTO DIFF WBC: CPT

## 2017-11-28 NOTE — PROGRESS NOTES
CHIEF COMPLAINT: mgmt of recurrent NSC Lung Cancer    Problem List:  Oncology/Hematology History    1. 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 07/2012 she had a new left upper lobe mass  in the apical region measuring 3 cm with small cavitary component compared to  06/2011. Subsequent PET in 08/2012 showed what was most likely postradiation  scar lighting up and not clearly PET malignancy at that junction. Most recent  PET scan 01/07/2013 revealed decreasing activity in the left apical pulmonary  parenchymal scar and was only borderline hypermetabolic at that time. Follow-up  CTs have been stable.   2. History of squamous cell carcinoma of the skin.         Malignant neoplasm of overlapping sites of left lung    1/10/2005 Initial Diagnosis     Malignant neoplasm of overlapping sites of right lung         11/10/2005 Surgery     Right pneumonectomy         7/22/2010 -  Radiation     Cyberknife for probable left upper lobe recurrence         1/18/2017 Progression            1/18/2017 Imaging     PET/CT IMPRESSION:  1. The patient is status post right pneumonectomy.  2. In the residual hyperexpanded left lung, there are 2 new  hypermetabolic nodules indicating new neoplastic disease in the  contralateral left lung.  Mediastinal or hilar involvement is currently  not identified. No other distant hypermetabolic focus or abnormality is  appreciated.         02/21/2017 - 02/24/2017 Radiation     Radiation OncologyTreatment Course:  Verenice Bravo received 5000 cGy in 4 fractions to left lung tumor via Stereotactic Radiation Therapy - SRT.         11/20/2017 Progression     PET/CT  Impression:     There is extensive progression of disease with abnormal  hypermetabolic activity and lesion seen within the mediastinum as well  as a large mass posteriorly within the left lung. The  abdomen and pelvis  is stable with also a small hypermetabolic focus seen near the right  temporal bone. Continued follow-up recommended as indicated.               HISTORY OF PRESENT ILLNESS:  The patient is a 72 y.o. female, here for follow up on management of NSC Lung Cancer.  I reviewed Pet results images and reports and discussed with Dr. Montgomery. Lost 2 sons to MI in September. Having solid dysphagia.        Past Medical History:   Diagnosis Date   • Arm and leg pain     Mild left   • Arthritis    • COPD (chronic obstructive pulmonary disease)    • H/O chest x-ray 04/22/2012    Spiculated BRAN lesion new in compariosn to previous. A chest CT is recommended   • Hypertension    • Lung cancer      Past Surgical History:   Procedure Laterality Date   • BACK SURGERY     • BLADDER SURGERY  04/2016    Tack with mesh   • EYE SURGERY      Cataract   • LUNG SURGERY      rt lung removed   • OTHER SURGICAL HISTORY      Pneumonectomy Complete   • THORACOTOMY     • TUBAL ABDOMINAL LIGATION         No Known Allergies    Family History and Social History reviewed and changed as necessary      REVIEW OF SYSTEM:   Review of Systems   Constitutional: Negative for appetite change, chills, diaphoresis, fatigue, fever and unexpected weight change.   HENT:   Negative for mouth sores, sore throat and trouble swallowing.    Eyes: Negative for icterus.   Respiratory: Negative for cough, hemoptysis and shortness of breath.    Cardiovascular: Negative for chest pain, leg swelling and palpitations.   Gastrointestinal: Negative for abdominal distention, abdominal pain, blood in stool, constipation, diarrhea, nausea and vomiting.   Endocrine: Negative for hot flashes.   Genitourinary: Negative for bladder incontinence, difficulty urinating, dysuria, frequency and hematuria.    Musculoskeletal: Negative for gait problem, neck pain and neck stiffness.   Skin: Negative for rash.   Neurological: Negative for dizziness, gait problem, headaches,  "light-headedness and numbness.   Hematological: Negative for adenopathy. Does not bruise/bleed easily.   Psychiatric/Behavioral: Negative for depression. The patient is not nervous/anxious.    All other systems reviewed and are negative.       PHYSICAL EXAM    Vitals:    11/28/17 1331   BP: 147/68   Pulse: 118   Resp: 16   Temp: 98.2 °F (36.8 °C)   Weight: 105 lb (47.6 kg)   Height: 63\" (160 cm)     Constitutional: Appears well-developed and well-nourished. No distress.   ECOG: (2) Ambulatory and capable of self care, unable to carry out work activity, up and about > 50% or waking hours  HENT:   Head: Normocephalic.   Mouth/Throat: Oropharynx is clear and moist.   Eyes: Conjunctivae are normal. Pupils are equal, round, and reactive to light. No scleral icterus.   Neck: Neck supple. No JVD present. No thyromegaly present.   Cardiovascular: Normal rate, regular rhythm and normal heart sounds.    Pulmonary/Chest: Breath sounds normal. No respiratory distress.   Abdominal: Soft. Exhibits no distension and no mass. There is no hepatosplenomegaly. There is no tenderness. There is no rebound and no guarding.   Musculoskeletal:Exhibits no edema, tenderness or deformity.   Neurological: Alert and oriented to person, place, and time. Exhibits normal muscle tone.   Skin: No ecchymosis, no petechiae and no rash noted. Not diaphoretic. No cyanosis. Nails show no clubbing.   Psychiatric: Normal mood and affect.   Vitals reviewed.      Lab on 11/28/2017   Component Date Value Ref Range Status   • WBC 11/28/2017 11.90* 3.50 - 10.80 10*3/mm3 Final   • RBC 11/28/2017 4.15  3.89 - 5.14 10*6/mm3 Final   • Hemoglobin 11/28/2017 12.0  11.5 - 15.5 g/dL Final   • Hematocrit 11/28/2017 36.9  34.5 - 44.0 % Final   • RDW 11/28/2017 14.8* 11.3 - 14.5 % Final   • MCV 11/28/2017 88.9  80.0 - 99.0 fL Final   • MCH 11/28/2017 28.9  27.0 - 31.0 pg Final   • MCHC 11/28/2017 32.4  32.0 - 36.0 g/dL Final   • MPV 11/28/2017 7.5  6.0 - 12.0 fL Final   • " Platelets 11/28/2017 386  150 - 450 10*3/mm3 Final   • Neutrophil % 11/28/2017 89.1* 41.0 - 71.0 % Final   • Lymphocyte % 11/28/2017 8.4* 24.0 - 44.0 % Final   • Monocyte % 11/28/2017 2.5  0.0 - 12.0 % Final   • Neutrophils, Absolute 11/28/2017 10.60* 1.50 - 8.30 10*3/mm3 Final   • Lymphocytes, Absolute 11/28/2017 1.00  0.60 - 4.80 10*3/mm3 Final   • Monocytes, Absolute 11/28/2017 0.30  0.00 - 1.00 10*3/mm3 Final   Hospital Outpatient Visit on 11/20/2017   Component Date Value Ref Range Status   • Glucose 11/20/2017 109  70 - 130 mg/dL Final       Assessment/Plan     1. 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 Nicki mets  4. Solid dysphagia due to 3.  5. COPD    Discussion:  45 min discussion with pt and family re recurrence and palliative nature of whatever rx we were to try.  Would do MR Brain & EBUS with bx for Caris with Robbin Dacosta IF decides to treat. Will see back in 10 days and she will decide.      João Watts MD    11/28/2017

## 2017-12-06 ENCOUNTER — TELEPHONE (OUTPATIENT)
Dept: ONCOLOGY | Facility: CLINIC | Age: 72
End: 2017-12-06

## 2017-12-06 RX ORDER — OXYCODONE HYDROCHLORIDE 30 MG/1
30 TABLET ORAL EVERY 6 HOURS PRN
Qty: 120 TABLET | Refills: 0 | Status: SHIPPED | OUTPATIENT
Start: 2017-12-06

## 2017-12-06 NOTE — TELEPHONE ENCOUNTER
----- Message from Chloe Calix sent at 12/6/2017 11:01 AM EST -----  Regarding: [facility] - RX   Contact: [phone]  BRENDA CAN, DAUGHTER OF PATIENT CALLED TO SEE IF DR. FATIMA COULD PRESCRIBE SOME PAIN MEDICATION. HER PAIN CLINIC WHERE SHE WAS GOING GOT SHUT DOWN.     SHE WAS GETTING ROXICET 30 MG .     PLEASE CALL WHEN READY TO .

## 2017-12-06 NOTE — TELEPHONE ENCOUNTER
Discussed with Opal Arboleda to refill oxycodone.  Dr. Palomares signed rx for patient.  Notified [facility] was ready for .

## 2017-12-07 ENCOUNTER — OFFICE VISIT (OUTPATIENT)
Dept: ONCOLOGY | Facility: CLINIC | Age: 72
End: 2017-12-07

## 2017-12-07 VITALS
HEART RATE: 110 BPM | DIASTOLIC BLOOD PRESSURE: 55 MMHG | TEMPERATURE: 97.8 F | SYSTOLIC BLOOD PRESSURE: 111 MMHG | WEIGHT: 106 LBS | BODY MASS INDEX: 18.78 KG/M2 | RESPIRATION RATE: 18 BRPM | HEIGHT: 63 IN

## 2017-12-07 DIAGNOSIS — C34.82 MALIGNANT NEOPLASM OF OVERLAPPING SITES OF LEFT LUNG (HCC): Primary | ICD-10-CM

## 2017-12-07 PROCEDURE — 99215 OFFICE O/P EST HI 40 MIN: CPT | Performed by: INTERNAL MEDICINE

## 2017-12-07 NOTE — PROGRESS NOTES
CHIEF COMPLAINT: Recurrent lung cancer    Problem List:  Oncology/Hematology History    1. 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 07/2012 she had a new left upper lobe mass  in the apical region measuring 3 cm with small cavitary component compared to  06/2011. Subsequent PET in 08/2012 showed what was most likely postradiation  scar lighting up and not clearly PET malignancy at that junction. Most recent  PET scan 01/07/2013 revealed decreasing activity in the left apical pulmonary  parenchymal scar and was only borderline hypermetabolic at that time. Follow-up  CTs have been stable.   2. History of squamous cell carcinoma of the skin.         Malignant neoplasm of overlapping sites of left lung    1/10/2005 Initial Diagnosis     Malignant neoplasm of overlapping sites of right lung         11/10/2005 Surgery     Right pneumonectomy         7/22/2010 -  Radiation     Cyberknife for probable left upper lobe recurrence         1/18/2017 Progression            1/18/2017 Imaging     PET/CT IMPRESSION:  1. The patient is status post right pneumonectomy.  2. In the residual hyperexpanded left lung, there are 2 new  hypermetabolic nodules indicating new neoplastic disease in the  contralateral left lung.  Mediastinal or hilar involvement is currently  not identified. No other distant hypermetabolic focus or abnormality is  appreciated.         02/21/2017 - 02/24/2017 Radiation     Radiation OncologyTreatment Course:  Verenice Bravo received 5000 cGy in 4 fractions to left lung tumor via Stereotactic Radiation Therapy - SRT.         11/20/2017 Progression     PET/CT  Impression:     There is extensive progression of disease with abnormal  hypermetabolic activity and lesion seen within the mediastinum as well  as a large mass posteriorly within the left lung. The abdomen and  pelvis  is stable with also a small hypermetabolic focus seen near the right  temporal bone. Continued follow-up recommended as indicated.               HISTORY OF PRESENT ILLNESS:  The patient is a 72 y.o. female, here for follow up on management of Recurrent lung cancer.  Reviewed PET scan results again with them.  She has been thinking about her options as we outlined last visit.      Past Medical History:   Diagnosis Date   • Arm and leg pain     Mild left   • Arthritis    • COPD (chronic obstructive pulmonary disease)    • H/O chest x-ray 04/22/2012    Spiculated BRAN lesion new in compariosn to previous. A chest CT is recommended   • Hypertension    • Lung cancer      Past Surgical History:   Procedure Laterality Date   • BACK SURGERY     • BLADDER SURGERY  04/2016    Tack with mesh   • EYE SURGERY      Cataract   • LUNG SURGERY      rt lung removed   • OTHER SURGICAL HISTORY      Pneumonectomy Complete   • THORACOTOMY     • TUBAL ABDOMINAL LIGATION         No Known Allergies    Family History and Social History reviewed and changed as necessary      REVIEW OF SYSTEM:   Review of Systems   Constitutional: Negative for appetite change, chills, diaphoresis, fatigue, fever and unexpected weight change.   HENT:   Negative for mouth sores, sore throat and trouble swallowing.    Eyes: Negative for icterus.   Respiratory: Negative for cough, hemoptysis and shortness of breath.    Cardiovascular: Negative for chest pain, leg swelling and palpitations.   Gastrointestinal: Negative for abdominal distention, abdominal pain, blood in stool, constipation, diarrhea, nausea and vomiting.   Endocrine: Negative for hot flashes.   Genitourinary: Negative for bladder incontinence, difficulty urinating, dysuria, frequency and hematuria.    Musculoskeletal: Negative for gait problem, neck pain and neck stiffness.   Skin: Negative for rash.   Neurological: Negative for dizziness, gait problem, headaches, light-headedness and  "numbness.   Hematological: Negative for adenopathy. Does not bruise/bleed easily.   Psychiatric/Behavioral: Negative for depression. The patient is not nervous/anxious.    All other systems reviewed and are negative.       PHYSICAL EXAM    Vitals:    12/07/17 0923   BP: 111/55   Pulse: 110   Resp: 18   Temp: 97.8 °F (36.6 °C)   Weight: 48.1 kg (106 lb)   Height: 160 cm (63\")     Constitutional: Appears well-developed and well-nourished. No distress.   ECOG: (0) Fully active, able to carry on all predisease performance without restriction  HENT:   Head: Normocephalic.   Mouth/Throat: Oropharynx is clear and moist.   Eyes: Conjunctivae are normal. Pupils are equal, round, and reactive to light. No scleral icterus.   Neck: Neck supple. No JVD present. No thyromegaly present.   Cardiovascular: Normal rate, regular rhythm and normal heart sounds.    Pulmonary/Chest: Breath sounds normal. No respiratory distress.   Abdominal: Soft. Exhibits no distension and no mass. There is no hepatosplenomegaly. There is no tenderness. There is no rebound and no guarding.   Musculoskeletal:Exhibits no edema, tenderness or deformity.   Neurological: Alert and oriented to person, place, and time. Exhibits normal muscle tone.   Skin: No ecchymosis, no petechiae and no rash noted. Not diaphoretic. No cyanosis. Nails show no clubbing.   Psychiatric: Normal mood and affect.   Vitals reviewed.      Lab on 11/28/2017   Component Date Value Ref Range Status   • Glucose 11/28/2017 140* 70 - 100 mg/dL Final   • BUN 11/28/2017 19  9 - 23 mg/dL Final   • Creatinine 11/28/2017 0.70  0.60 - 1.30 mg/dL Final   • Sodium 11/28/2017 134  132 - 146 mmol/L Final   • Potassium 11/28/2017 3.1* 3.5 - 5.5 mmol/L Final   • Chloride 11/28/2017 99  99 - 109 mmol/L Final   • CO2 11/28/2017 29.0  20.0 - 31.0 mmol/L Final   • Calcium 11/28/2017 9.2  8.7 - 10.4 mg/dL Final   • Total Protein 11/28/2017 6.3  5.7 - 8.2 g/dL Final   • Albumin 11/28/2017 3.80  3.20 - 4.80 " g/dL Final   • ALT (SGPT) 11/28/2017 13  7 - 40 U/L Final   • AST (SGOT) 11/28/2017 14  0 - 33 U/L Final   • Alkaline Phosphatase 11/28/2017 81  25 - 100 U/L Final   • Total Bilirubin 11/28/2017 0.5  0.3 - 1.2 mg/dL Final   • eGFR Non African Amer 11/28/2017 82  >60 mL/min/1.73 Final   • Globulin 11/28/2017 2.5  gm/dL Final   • A/G Ratio 11/28/2017 1.5  1.5 - 2.5 g/dL Final   • BUN/Creatinine Ratio 11/28/2017 27.1* 7.0 - 25.0 Final   • Anion Gap 11/28/2017 6.0  3.0 - 11.0 mmol/L Final   • WBC 11/28/2017 11.90* 3.50 - 10.80 10*3/mm3 Final   • RBC 11/28/2017 4.15  3.89 - 5.14 10*6/mm3 Final   • Hemoglobin 11/28/2017 12.0  11.5 - 15.5 g/dL Final   • Hematocrit 11/28/2017 36.9  34.5 - 44.0 % Final   • RDW 11/28/2017 14.8* 11.3 - 14.5 % Final   • MCV 11/28/2017 88.9  80.0 - 99.0 fL Final   • MCH 11/28/2017 28.9  27.0 - 31.0 pg Final   • MCHC 11/28/2017 32.4  32.0 - 36.0 g/dL Final   • MPV 11/28/2017 7.5  6.0 - 12.0 fL Final   • Platelets 11/28/2017 386  150 - 450 10*3/mm3 Final   • Neutrophil % 11/28/2017 89.1* 41.0 - 71.0 % Final   • Lymphocyte % 11/28/2017 8.4* 24.0 - 44.0 % Final   • Monocyte % 11/28/2017 2.5  0.0 - 12.0 % Final   • Neutrophils, Absolute 11/28/2017 10.60* 1.50 - 8.30 10*3/mm3 Final   • Lymphocytes, Absolute 11/28/2017 1.00  0.60 - 4.80 10*3/mm3 Final   • Monocytes, Absolute 11/28/2017 0.30  0.00 - 1.00 10*3/mm3 Final   Hospital Outpatient Visit on 11/20/2017   Component Date Value Ref Range Status   • Glucose 11/20/2017 109  70 - 130 mg/dL Final       Assessment/Plan     1.  Non-small cell lung cancer  2. Lung and meghan recurrence    Discussion: I have no curative options does systemic therapy might be palliative.  At this junction in her life she does not want any systemic therapies of any kind even possible immunotherapies.  She is at peace with hospice and wants to go down that road.  We will accommodate that wish.  She is on oxycodone 30 mg every 6 hours and I told her she can take that up to  every 4 hours and we will fill her prescriptions through hospice from this point forward.  She is a DNR      João Watts MD    12/07/2017

## 2020-01-17 NOTE — LETTER
"January 12, 2017     Angelique Miller MD  [address]  [facility] [address]    Patient: Verenice Bravo   YOB: 1945   Date of Visit: 1/12/2017       Dear Dr. Angela MD:    Verenice Bravo was in my office today. Below are the relevant portions of my assessment and plan of care.      Impression & Plan    1)  Abnormal CT - concerning for recurrent malignancy.  Navigational bronchoscopy not possible to my knowledge in the setting of pneumonectomy the system will not \"register\" properly.  Radial EBUS without navigation with lesions this small would be very low yield.  CT guided biopsy would have a very high risk of pneumothorax in a patient with compromised lung function and pneumonectomy this risk would be prohibitive.  The largest and most concerning lesion is 9 mm plus which would be above the 8 mm threshold for PET.  Safest thing to do would be to do a PET and if positive treat with empiric SBRT.  If PET negative will repeat CT in 3 months.    2) COPD - continue symbicort and rescue inhaler, add incruse if her insurance will pay for it    Will call patient with results of PET    RTC 3 months      If you have questions, please do not hesitate to call me. I look forward to following Verenice along with you.         Sincerely,        Robbin Dacosta MD        CC: No Known Provider  MD Brando Dumas MD Anthony G Rogers, MD Lee G Hicks, MD  " operating room